# Patient Record
(demographics unavailable — no encounter records)

---

## 2025-05-15 NOTE — HISTORY OF PRESENT ILLNESS
[de-identified] : 48F presenting with recurrent acute sinusitis and AR. Uses flonase, OTC antihistamine and saline irrigations daily. She reports difficulty breathing through nose, R>L. No recent imaging. She reports multiple sinus infections a year requiring antibiotics and will always have a lingering dry cough for about 1 month after. Hx of multiple nasal fractures in the past. She has done allergy shots in the past.

## 2025-05-15 NOTE — PLAN
[TextEntry] : Continue regimen of OTC antihistamine/nasal sprays.  I would like to see her when she is acutely sick for culture and consideration of imaging.

## 2025-05-15 NOTE — PROCEDURE
[Anterior rhinoscopy insufficient to account for symptoms] : anterior rhinoscopy insufficient to account for symptoms [None] : none [Flexible Endoscope] : examined with the flexible endoscope [de-identified] : Nasal Endoscopy: Procedure: Bilateral nasal endoscopy (CPT 15989)   Indication: Anterior rhinoscopy was inadequate to evaluate pathology.  Left: Septum deviated R Moderate inferior turbinate hypertrophy  Middle meatus clear, without masses, polyps, or pus Sphenoethmoidal recess clear, without masses, polyps, or pus  Right:  Septum deviated R,  Moderate inferior turbinate hypertrophy Middle meatus clear, without masses, polyps, or pus  Accessory os with thick clear mucus Sphenoethmoidal recess clear, without masses, polyps, or pus

## 2025-07-14 NOTE — PHYSICAL EXAM
[Appropriately responsive] : appropriately responsive [Alert] : alert [No Acute Distress] : no acute distress [No Lymphadenopathy] : no lymphadenopathy [Soft] : soft [Non-tender] : non-tender [Non-distended] : non-distended [No HSM] : No HSM [No Lesions] : no lesions [No Mass] : no mass [Oriented x3] : oriented x3 [Examination Of The Breasts] : a normal appearance [No Masses] : no breast masses were palpable [Labia Majora] : normal [Labia Minora] : normal [Normal] : normal [Uterine Adnexae] : normal [FreeTextEntry9] : Guaiac neg

## 2025-07-14 NOTE — HISTORY OF PRESENT ILLNESS
[FreeTextEntry1] : 49 yo  with LMP 2023 with hyst for annual  Feeling hot at night. No day flashes. Feeling supported. 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

## 2025-07-14 NOTE — DISCUSSION/SUMMARY
[FreeTextEntry1] : Health Maintenance:  -Pap with HPV reflex today -Mammo Rx -TBSE -colonoscopy guidelines reviewed with patient. Guaiac neg -Achieve Vit D levels of 30-40, intake of 1100 mg daily calcium mostly thru dark green leafy greens and milk products, exercise 30 minutes TIW